# Patient Record
Sex: FEMALE | Race: WHITE
[De-identification: names, ages, dates, MRNs, and addresses within clinical notes are randomized per-mention and may not be internally consistent; named-entity substitution may affect disease eponyms.]

---

## 2017-03-06 ENCOUNTER — HOSPITAL ENCOUNTER (EMERGENCY)
Dept: HOSPITAL 25 - ED | Age: 27
Discharge: HOME | End: 2017-03-06
Payer: COMMERCIAL

## 2017-03-06 VITALS — SYSTOLIC BLOOD PRESSURE: 113 MMHG | DIASTOLIC BLOOD PRESSURE: 79 MMHG

## 2017-03-06 DIAGNOSIS — K52.9: Primary | ICD-10-CM

## 2017-03-06 DIAGNOSIS — F17.210: ICD-10-CM

## 2017-03-06 LAB
ALBUMIN SERPL BCG-MCNC: 3.8 G/DL (ref 3.2–5.2)
ALP SERPL-CCNC: 44 U/L (ref 34–104)
ALT SERPL W P-5'-P-CCNC: 9 U/L (ref 7–52)
AMYLASE SERPL-CCNC: 54 U/L (ref 29–103)
ANION GAP SERPL CALC-SCNC: 4 MMOL/L (ref 2–11)
AST SERPL-CCNC: 12 U/L (ref 13–39)
BUN SERPL-MCNC: 11 MG/DL (ref 6–24)
BUN/CREAT SERPL: 14.9 (ref 8–20)
CALCIUM SERPL-MCNC: 8.5 MG/DL (ref 8.6–10.3)
CHLORIDE SERPL-SCNC: 105 MMOL/L (ref 101–111)
GLOBULIN SER CALC-MCNC: 2.7 G/DL (ref 2–4)
GLUCOSE SERPL-MCNC: 90 MG/DL (ref 70–100)
HCO3 SERPL-SCNC: 26 MMOL/L (ref 22–32)
HCT VFR BLD AUTO: 36 % (ref 35–47)
HGB BLD-MCNC: 11.5 G/DL (ref 12–16)
LIPASE SERPL-CCNC: 10 U/L (ref 11–82)
MCH RBC QN AUTO: 27 PG (ref 27–31)
MCHC RBC AUTO-ENTMCNC: 32 G/DL (ref 31–36)
MCV RBC AUTO: 85 FL (ref 80–97)
POTASSIUM SERPL-SCNC: 4.2 MMOL/L (ref 3.5–5)
PROT SERPL-MCNC: 6.5 G/DL (ref 6.4–8.9)
RBC # BLD AUTO: 4.21 10^6/UL (ref 4–5.4)
SODIUM SERPL-SCNC: 135 MMOL/L (ref 133–145)
WBC # BLD AUTO: 14.8 10^3/UL (ref 3.5–10.8)

## 2017-03-06 PROCEDURE — 99283 EMERGENCY DEPT VISIT LOW MDM: CPT

## 2017-03-06 PROCEDURE — 74177 CT ABD & PELVIS W/CONTRAST: CPT

## 2017-03-06 PROCEDURE — 85025 COMPLETE CBC W/AUTO DIFF WBC: CPT

## 2017-03-06 PROCEDURE — 96374 THER/PROPH/DIAG INJ IV PUSH: CPT

## 2017-03-06 PROCEDURE — 82150 ASSAY OF AMYLASE: CPT

## 2017-03-06 PROCEDURE — 96375 TX/PRO/DX INJ NEW DRUG ADDON: CPT

## 2017-03-06 PROCEDURE — 96376 TX/PRO/DX INJ SAME DRUG ADON: CPT

## 2017-03-06 PROCEDURE — 83690 ASSAY OF LIPASE: CPT

## 2017-03-06 PROCEDURE — 36415 COLL VENOUS BLD VENIPUNCTURE: CPT

## 2017-03-06 PROCEDURE — 96360 HYDRATION IV INFUSION INIT: CPT

## 2017-03-06 PROCEDURE — 80053 COMPREHEN METABOLIC PANEL: CPT

## 2017-03-06 PROCEDURE — 83605 ASSAY OF LACTIC ACID: CPT

## 2017-03-06 PROCEDURE — 86140 C-REACTIVE PROTEIN: CPT

## 2017-03-06 NOTE — ED
Abdominal Pain/Female





- HPI Summary


HPI Summary: 


Patient presents with diffuse abdominal pain that began last night without 

known cause. The pain is a dull constant with episode of intense pain that 

subside spontaneously. Her pain is made worse by deep breaths and movement, and 

made better when her knees are tucked up. She has nausea without vomiting and 

has had diarrhea vs. loose stool for the past 2 weeks. She denies recent illness

, fever, chills, prior abdominal surgeries or food exposure. Her periods are 

regular and she has a monogamous partner. No vaginal discharge.





- History of Current Complaint


Chief Complaint: EDAbdPain


Stated Complaint: ABD PAIN


Time Seen by Provider: 03/06/17 08:58


Hx Obtained From: Patient


Hx Last Menstrual Period: May 20, 2016


Pregnant?: No


Onset/Duration: Gradual Onset


Timing: Constant


Severity Initially: Moderate


Severity Currently: Severe


Pain Intensity: 8


Location: Diffuse


Radiates: No


Character: Sharp, Colicy


Aggravating Factor(s): Movement, Deep Breaths


Alleviating Factor(s): Other: - bending knees to chest


Associated Signs and Symptoms: Positive: Nausea, Diarrhea


Allergies/Adverse Reactions: 


 Allergies











Allergy/AdvReac Type Severity Reaction Status Date / Time


 


No Known Allergies Allergy   Verified 12/02/16 20:43














PMH/Surg Hx/FS Hx/Imm Hx


Endocrine/Hematology History: Reports: Hx Thyroid Disease - resolved


   Denies: Hx Diabetes


Cardiovascular History: 


   Denies: Hx Hypertension


Respiratory History: Reports: Hx Asthma


   Denies: Hx Chronic Obstructive Pulmonary Disease (COPD)


GI History: 


   Denies: Hx Ulcer





- Surgical History


Surgery Procedure, Year, and Place: C section 2012


Infectious Disease History: No


Infectious Disease History: 


   Denies: Hx Hepatitis, Hx Human Immunodeficiency Virus (HIV), History Other 

Infectious Disease, Traveled Outside the [place] in Last 30 Days





- Family History


Known Family History: Positive: None, Cardiac Disease - quadruple bypass 

surgery - father, Diabetes, Other - thyroid disease - mother


   Negative: Blood Disorder - pt denies bleeding disorders in family





- Social History


Occupation: Employed Full-time


Lives: With Family


Alcohol Use: Occasionally


Hx Substance Use: No


Substance Use Type: Reports: None


Hx Tobacco Use: Yes


Smoking Status (MU): Light Every Day Tobacco Smoker


Amount Used/How Often: 6-7 cigs


Have You Smoked in the Last Year: Yes


Cessation Counseling: Patient Advised to Stop





Review of Systems


Negative: Fever


Negative: Chest Pain


Negative: Shortness Of Breath


Positive: Abdominal Pain, Diarrhea, Nausea.  Negative: Vomiting


Negative: Headache


Psychological: Normal


All Other Systems Reviewed And Are Negative: Yes





Physical Exam


Triage Information Reviewed: Yes


Vital Signs On Initial Exam: 


 Initial Vitals











Temp Pulse Resp BP Pulse Ox


 


 98.2 F   110   16   105/64   97 


 


 03/06/17 08:51  03/06/17 08:51  03/06/17 08:51  03/06/17 08:51  03/06/17 08:51











Vital Signs Reviewed: Yes


Appearance: Positive: Well-Appearing, Well-Nourished, Pain Distress


Skin: Positive: Warm, Skin Color Reflects Adequate Perfusion, Dry


Head/Face: Positive: Normal Head/Face Inspection


Eyes: Positive: EOMI, JERILYN, Conjunctiva Clear


ENT: Positive: Hearing grossly normal


Neck: Positive: Supple, Nontender, No Lymphadenopathy


Respiratory/Lung Sounds: Positive: Clear to Auscultation, Breath Sounds Present


Cardiovascular: Positive: Tachycardia


Abdomen Description: Positive: Soft, Guarding.  Negative: Nontender - diffuse 

tenderness to light palpation, CVA Tenderness (R), CVA Tenderness (L)


Bowel Sounds: Positive: Present


Musculoskeletal: Positive: Strength/ROM Intact.  Negative: Edema Left, Edema 

Right


Neurological: Positive: Sensory/Motor Intact, Alert, Oriented to Person Place, 

Time, NV Bundle Intact Distally


Psychiatric: Positive: Affect/Mood Appropriate


AVPU Assessment: Alert





- Gordy Coma Scale


Coma Scale Total: 15





Diagnostics





- Vital Signs


 Vital Signs











  Temp Pulse Resp BP Pulse Ox


 


 03/06/17 09:39    20  


 


 03/06/17 09:18  99.9 F    


 


 03/06/17 09:07   100    95


 


 03/06/17 09:05     119/68 


 


 03/06/17 08:51  98.2 F  110  16  105/64  97














- Laboratory


Lab Results: 


 Lab Results











  03/06/17 Range/Units





  09:25 


 


WBC  14.8 H  (3.5-10.8)  10^3/ul


 


RBC  4.21  (4.0-5.4)  10^6/ul


 


Hgb  11.5 L  (12.0-16.0)  g/dl


 


Hct  36  (35-47)  %


 


MCV  85  (80-97)  fL


 


MCH  27  (27-31)  pg


 


MCHC  32  (31-36)  g/dl


 


RDW  15  (10.5-15)  %


 


Plt Count  221  (150-450)  10^3/ul


 


MPV  8  (7.4-10.4)  um3


 


Neut % (Auto)  86.7 H  (38-83)  %


 


Lymph % (Auto)  4.9 L  (25-47)  %


 


Mono % (Auto)  6.6  (1-9)  %


 


Eos % (Auto)  1.4  (0-6)  %


 


Baso % (Auto)  0.4  (0-2)  %


 


Absolute Neuts (auto)  12.9 H  (1.5-7.7)  10^3/ul


 


Absolute Lymphs (auto)  0.7 L  (1.0-4.8)  10^3/ul


 


Absolute Monos (auto)  1.0 H  (0-0.8)  10^3/ul


 


Absolute Eos (auto)  0.2  (0-0.6)  10^3/ul


 


Absolute Basos (auto)  0.1  (0-0.2)  10^3/ul


 


Absolute Nucleated RBC  0.01  10^3/ul


 


Nucleated RBC %  0.1  











Result Diagrams: 


 03/06/17 09:25





 03/06/17 09:25


Lab Statement: Any lab studies that have been ordered have been reviewed, and 

results considered in the medical decision making process.





- CT


  ** No standard instances


CT Interpretation: Positive (See Comments)


CT Interpretation Completed By: Radiologist - colitis





Re-Evaluation





- Re-Evaluation


  ** First Eval


Re-Evaluation Time: 10:00


Change: Improved


Comment: pain decreased with medication





Abdominal Pain Fem Course/Dx





- Diagnoses


Differential Diagnosis: Positive: Appendicitis, Bowel Obstruction, Constipation

, Diverticulitis, Ovarian Cyst, Urinary Tract Infection


Provider Diagnoses: 


 Colitis








- Provider Notifications


Discussed Care Of Patient With: Dr. Onofre, ED attending.





Discharge





- Discharge Plan


Condition: Stable


Disposition: HOME


Prescriptions: 


Ciprofloxacin TAB* [Cipro Tab*] 500 mg PO BID #13 tab


Ibuprofen TAB* [Motrin TAB* 600 MG] 600 mg PO Q6H PRN #60 tab


 PRN Reason: Pain


Metronidazole [Flagyl 500 MG TAB] 500 mg PO TID #20 tab


Ondansetron ODT TAB* [Zofran Odt TAB*] 4 mg PO Q6H PRN #20 tab.odt


 PRN Reason: Nausea


traMADol TAB* [Ultram*] 50 mg PO Q6HR PRN #32 tab MDD 8


 PRN Reason: Pain


Patient Education Materials:  Colitis (ED)


Forms:  *Work Release


Referrals: 


Abdon Ty MD [Primary Care Provider] - 


Additional Instructions: 


Please take the medication provided until it is completely gone. Use ibuprofen 

600 mg every 6 hours with meals to reduce pain and swelling. Add Tramadol for 

uncontrolled pain. Follow-up with your primary care provider in 3-4 days for 

evaluation. Drink extra fluids and use the anti-nausea medication as needed. 

Return to the emergency department if symptoms worsen.

## 2017-12-02 ENCOUNTER — HOSPITAL ENCOUNTER (EMERGENCY)
Dept: HOSPITAL 25 - UCEAST | Age: 27
Discharge: HOME | End: 2017-12-02
Payer: COMMERCIAL

## 2017-12-02 VITALS — SYSTOLIC BLOOD PRESSURE: 127 MMHG | DIASTOLIC BLOOD PRESSURE: 59 MMHG

## 2017-12-02 DIAGNOSIS — L02.01: Primary | ICD-10-CM

## 2017-12-02 DIAGNOSIS — F31.9: ICD-10-CM

## 2017-12-02 DIAGNOSIS — B95.62: ICD-10-CM

## 2017-12-02 DIAGNOSIS — F17.210: ICD-10-CM

## 2017-12-02 PROCEDURE — 99212 OFFICE O/P EST SF 10 MIN: CPT

## 2017-12-02 PROCEDURE — 87205 SMEAR GRAM STAIN: CPT

## 2017-12-02 PROCEDURE — G0463 HOSPITAL OUTPT CLINIC VISIT: HCPCS

## 2017-12-02 PROCEDURE — 87186 SC STD MICRODIL/AGAR DIL: CPT

## 2017-12-02 PROCEDURE — 87070 CULTURE OTHR SPECIMN AEROBIC: CPT

## 2017-12-02 PROCEDURE — 87077 CULTURE AEROBIC IDENTIFY: CPT

## 2017-12-02 NOTE — UC
Skin Complaint HPI





- HPI Summary


HPI Summary: 


4 DAY H/O TENDER BUMP ON FOREHEAD. GETTING BIGGER. TRIED TO SQUEEZE IT BUT 

NOTHING CAME OUT. NO FEVER. HAS A H/O RECURRENT AXILLARY ABSCESSES BUT NONE IN 

SOME YEARS. DOES NOT KNOW IF SHE EVER HAD MRSA.





- History of Current Complaint


Chief Complaint: UCSkin


Time Seen by Provider: 12/02/17 18:25


Stated Complaint: RASH


Hx Obtained From: Patient


Hx Last Menstrual Period: 12/2/17


Onset/Duration: Gradual Onset, Lasting Days, Still Present


Timing: Constant


Onset Severity: Moderate


Current Severity: Moderate


Pain Intensity: 6


Pain Scale Used: 0-10 Numeric


Location: Discrete - RIGHT SIDE OF FOREHEAD


Character: Pain, Redness, Raised


Aggravating Factor(s): Touch


Alleviating Factor(s): Nothing


Associated Signs & Symptoms: Positive: Tenderness





- Allergy/Home Medications


Allergies/Adverse Reactions: 


 Allergies











Allergy/AdvReac Type Severity Reaction Status Date / Time


 


No Known Allergies Allergy   Verified 12/02/17 18:13











Home Medications: 


 Home Medications





Fluoxetine HCl [Prozac] 1 tab PO DAILY 12/02/17 [History Confirmed 12/02/17]


Oxcarbazepine [Trileptal 150 mg] 1 tab PO DAILY 12/02/17 [History Confirmed 12/ 02/17]











Review of Systems


Constitutional: Negative


Skin: Other - TENDER LESION ON FOREHEAD


Respiratory: Negative


Cardiovascular: Negative


Gastrointestinal: Negative


All Other Systems Reviewed And Are Negative: Yes





PMH/Surg Hx/FS Hx/Imm Hx


Psychological History: Depression, Bipolar Disorder





- Surgical History


Surgical History: Yes


Surgery Procedure, Year, and Place: C section 2012





- Family History


Known Family History: Positive: Cardiac Disease - quadruple bypass surgery - 

father, Hypertension, Diabetes, Other - thyroid disease - mother


   Negative: Blood Disorder - pt denies bleeding disorders in family





- Social History


Alcohol Use: Occasionally


Substance Use Type: None


Smoking Status (MU): Light Every Day Tobacco Smoker


Type: Cigarettes


Amount Used/How Often: 1/2PPD


Have You Smoked in the Last Year: Yes


Household Exposure Type: Cigarettes





- Immunization History


Most Recent Influenza Vaccination: unknown





Physical Exam


Triage Information Reviewed: Yes


Appearance: Well-Appearing, No Pain Distress, Well-Nourished


Vital Signs: 


 Initial Vital Signs











Temp  99.0 F   12/02/17 18:08


 


Pulse  72   12/02/17 18:08


 


Resp  16   12/02/17 18:08


 


BP  127/59   12/02/17 18:08


 


Pulse Ox  100   12/02/17 18:08











Vital Signs Reviewed: Yes


Eyes: Positive: Conjunctiva Clear


ENT: Positive: Hearing grossly normal


Neck: Positive: Supple


Respiratory: Positive: No respiratory distress, No accessory muscle use


Cardiovascular: Positive: Pulses Normal


Abdomen Description: Positive: Soft


Musculoskeletal: Positive: No Edema


Neurological: Positive: Alert


Psychological: Positive: Age Appropriate Behavior


Skin: Positive: Other - 1CM TENDER, RAISED, ERYTHEMATOUS LESION RIGHT SIDE OF 

FOREHEAD WITH CENTRAL SCAB.





Course/Dx





- Diagnoses


Provider Diagnoses: ABSCESS FOREHEAD





Discharge





- Discharge Plan


Condition: Stable


Disposition: HOME


Prescriptions: 


Sulfamethox/Trimethoprim DS* [Bactrim /160 TAB*] 1 tab PO BID #13 tab


Patient Education Materials:  Abscess (ED)


Referrals: 


Abdon Ty MD [Medical Doctor] - If Needed


Additional Instructions: 


WARM/HOT COMPRESSES AT LEAST 4 TIMES DAILY





SPECIMEN SENT FOR CULTURE





TAKE FULL COURSE OF ANTIBIOTICS. CALL IN 3-4 DAYS FOR RESULTS.





SEEK FOLLOW-UP IF SYMPTOMS NOT IMPROVING AS EXPECTED WITH TREATMENT.

## 2017-12-05 NOTE — UC
Progress





- Progress Note


Progress Note: 





+ MRSA 


Pt on Bactrim


Pt with + sensitivity 


no change


Josie 12/5/2017

## 2019-07-28 ENCOUNTER — HOSPITAL ENCOUNTER (EMERGENCY)
Dept: HOSPITAL 25 - ED | Age: 29
Discharge: HOME | End: 2019-07-28
Payer: COMMERCIAL

## 2019-07-28 VITALS — DIASTOLIC BLOOD PRESSURE: 79 MMHG | SYSTOLIC BLOOD PRESSURE: 134 MMHG
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
## 2019-07-28 DIAGNOSIS — S91.311A: Primary | ICD-10-CM
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
## 2019-07-28 DIAGNOSIS — F17.210: ICD-10-CM

## 2019-07-28 DIAGNOSIS — Z23: ICD-10-CM

## 2019-07-28 PROCEDURE — 90715 TDAP VACCINE 7 YRS/> IM: CPT

## 2019-07-28 PROCEDURE — 99282 EMERGENCY DEPT VISIT SF MDM: CPT

## 2019-07-28 PROCEDURE — 90471 IMMUNIZATION ADMIN: CPT

## 2019-07-28 NOTE — ED
Skin Complaint





- HPI Summary


HPI Summary: 





This patient is a 28 year old F presenting to [facility] accompanied by her  

with a chief complaint of A cut on [place] 

happened at 1700 7/27/19. She states that the pain is rated a 4/10 in severity. 

She denies any swelling. She stated that she cant walk on her R foot due to 

the pain. She states no alleviating factors. Pt denies any fever, chills, 

erythema of eyes, sore throat, CP, SOB, cough, abdominal pain, N/V, dysuria, 

hematuria, myalgia, edema, rash, or dizziness. She stated that she was unsure 

of when her last tetanus shot was. 





- History of Current Complaint


Chief Complaint: EDExtremityLower


Time Seen by Provider: 07/28/19 20:51


Stated Complaint: RIGHT FOOT LACERATION/INFECTION PER PT


Hx Obtained From: Patient


Onset/Duration: Started Days Ago - 1, Traumatic, Still Present


Skin Exposure Onset/Duration: Days Ago - 1


Timing: Constant


Onset Severity: Moderate


Current Severity: Moderate


Pain Intensity: 4


Pain Scale Used: 0-10 Numeric


Skin Location: Foot - 4


Character: Pain


Aggravating Symptom(s): Touch, Other: - standing


Alleviating Symptom(s): Nothing


Associated Signs & Symptoms: Negative - fever, chills, erythema of eyes, sore 

throat, CP, SOB, cough, abdominal pain, N/V, dysuria, hematuria, myalgia, edema

, rash, or dizziness.


Related History: Trauma - stepped on a rock while swimming [place] a lake





- Allergy/Home Medications


Allergies/Adverse Reactions: 


 Allergies











Allergy/AdvReac Type Severity Reaction Status Date / Time


 


No Known Allergies Allergy   Verified 07/28/19 19:42














PMH/Surg Hx/FS Hx/Imm Hx


Previously Healthy: Yes


Endocrine/Hematology History: Reports: Hx Thyroid Disease - resolved


   Denies: Hx Diabetes


Cardiovascular History: 


   Denies: Hx Hypertension


Respiratory History: Reports: Hx Asthma


   Denies: Hx Chronic Obstructive Pulmonary Disease (COPD)


GI History: 


   Denies: Hx Ulcer


 History: 


   Denies: Hx Dialysis, Hx Renal Disease





- Surgical History


Surgery Procedure, Year, and Place: C section 2012


Infectious Disease History: No


Infectious Disease History: 


   Denies: Hx Hepatitis, Hx Human Immunodeficiency Virus (HIV), History Other 

Infectious Disease, Traveled Outside the [place] in Last 30 Days





- Family History


Known Family History: Positive: Cardiac Disease - quadruple bypass surgery - 

father, Hypertension, Diabetes, Other - thyroid disease - mother


   Negative: Blood Disorder - pt denies bleeding disorders in family





- Social History


Occupation: Employed Full-time


Lives: With Family


Alcohol Use: Occasionally


Hx Substance Use: No


Substance Use Type: Reports: None


Hx Tobacco Use: Yes


Smoking Status (MU): Light Every Day Tobacco Smoker


Type: Cigarettes


Amount Used/How Often: 1/2PPD


Have You Smoked in the Last Year: Yes





Review of Systems


Negative: Fever, Skin Diaphoresis


Negative: Erythema


Negative: Sore Throat


Negative: Chest Pain


Negative: Shortness Of Breath


Negative: Abdominal Pain, Vomiting, Nausea


Negative: dysuria, hematuria


Positive: Myalgia - R foot pain.  Negative: Edema


Positive: Other - Small laceration to her R foot.  Negative: Rash


Neurological: Negative - dizziness


Negative: Headache


All Other Systems Reviewed And Are Negative: Yes





Physical Exam





- Summary


Physical Exam Summary: 





VITAL SIGNS: Reviewed.


GENERAL:  Patient is a well-developed and nourished female who is lying 

comfortable in the stretcher. Patient is not in any acute respiratory distress.


HEAD AND FACE: No signs of trauma. No ecchymosis, hematomas or skull 

depressions. No sinus tenderness.


EYES: PERRLA, EOMI x 2, No injected conjunctiva, no nystagmus.


EARS: Hearing grossly intact. Ear canals and tympanic membranes are within 

normal limits.


MOUTH: Oropharynx within normal limits.


NECK: Supple, trachea is midline, no adenopathy, no JVD, no carotid bruit, no c-

spine tenderness, neck with full ROM


CHEST: Symmetric, no tenderness at palpation


LUNGS: Clear to auscultation bilaterally. No wheezing or crackles.


CVS: Regular rate and rhythm, S1 and S2 present, no murmurs or gallops 

appreciated.


ABDOMEN: Soft, non-tender. No signs of distention. No rebound no guarding, and 

no masses palpated. Bowel sounds are normal.


EXTREMITIES: FROM in all major joints, no edema, no cyanosis or clubbing.


NEURO: Alert and oriented x 3. No acute neurological deficits. Speech is normal 

and follows commands.


SKIN: Dry and warm. 1.5 cm superficial laceration with surrounding tenderness 

without erythema to the Sole of the R foot just proximal to the 4th web space





Triage Information Reviewed: Yes


Vital Signs On Initial Exam: 


 Initial Vitals











Temp Pulse Resp BP Pulse Ox


 


 97.4 F   91   15   132/88   97 


 


 07/28/19 19:41  07/28/19 19:41  07/28/19 19:41  07/28/19 19:41  07/28/19 19:41











Vital Signs Reviewed: Yes





Diagnostics





- Vital Signs


 Vital Signs











  Temp Pulse Resp BP Pulse Ox


 


 07/28/19 19:41  97.4 F  91  15  132/88  97














- Laboratory


Lab Statement: Any lab studies that have been ordered have been reviewed, and 

results considered in the medical decision making process.





Course/Dx





- Course


Course Of Treatment: This pt is a 27 y/o F presenting to [facility] with a CC of a R 

foot laceraion she [place] 1500 7[place]27/19.  Upon 

her PE she is found to have a 1.5 cm superficial laceration with surrounding 

tenderness without erythema to the Sole of the R foot just proximal to the 4th 

web space.  The laceration will not be sutured due to it being older than 24 

hours to avoid the risk of increasing a possible infection. She will be 

discharged home with a Dx of a R foot laceration. She will be prescribed ABX 

and given a work note excusing her from work tomorrow 7/29/19 and permitting 

her to wear open toed shoes for the next 10 days.





- Diagnoses


Provider Diagnoses: 


 Laceration of right foot





Is Visit Pregnancy Related: No





Discharge





- Sign-Out/Discharge


Documenting (check all that apply): Patient Departure - discharge


Patient Received Moderate/Deep Sedation with Procedure: No





- Discharge Plan


Condition: Stable


Disposition: HOME


Patient Education Materials:  Laceration (ED)


Forms:  *Work Release


Referrals: 


[facility] [Outside] - 2 Days


Additional Instructions: 


Please take the prescribed medications as directed and return to the emergency 

department for any new or worsening symptoms. Follow up with [facility] in 2-3 days. Wear opened toed shoes for the next 10 days 

including while at work. 





- Attestation Statements


Document Initiated by Scribe: Yes


Documenting Scribe: Douglas Jean


Provider For Whom Scribe is Documenting (Include Credential): Harriet Antony MD


Scribe Attestation: 


Douglas COPPOLA, scribed for Harriet Antony MD on 07/28/19 at 2117. 


Status of Scribe Document: Ready

## 2022-09-15 NOTE — RAD
INDICATION:  Diffuse abdominal pain and diarrhea.



COMPARISON:  There are no prior studies available for comparison.



TECHNIQUE: A CT scan of the abdomen and pelvis was performed with intravenous and oral

contrast following intravenous injection of 93 ml of  Omnipaque 300 nonionic contrast.

Contiguous axial sections were obtained from the lung bases through the symphysis pubis.

Images were reconstructed in the coronal and sagittal planes.



FINDINGS:  There is mild dependent bilateral lower lobe subsegmental atelectasis.  No

pleural effusion is present.



The liver and spleen are within normal limits in size without significant focal

abnormality. No calcified gallstones are seen. The pancreas appears to be within normal

limits in size.



The kidneys and adrenal glands are normal in size. No hydronephrosis is seen.  No

significant focal renal abnormality is seen.



The aorta is normal in caliber and demonstrates homogeneous contrast opacification.



No significant enlarged retroperitoneal lymph nodes are seen.



The stomach, small and large bowel appear nondistended.  The appendix is within normal

limits.  There is mild thickening of the wall of the descending and sigmoid colon

suggestive of mild colitis.



The uterus is anteverted and mildly prominent measuring 10.6 x 6.0 x 4.7 cm in size. There

is prominence of the endometrial stripe measuring 1.4 cm in thickness. There is a 2.3 x

1.8 cm right ovarian cyst. There is a small amount of free intraperitoneal fluid in the

cul-de-sac. No free intraperitoneal air is seen.



No significant focal osseous abnormality is seen.



IMPRESSION:  

1. MILD THICKENING OF THE WALL OF THE DESCENDING AND SIGMOID COLON SUGGESTIVE OF COLITIS.

2. MILD ENLARGEMENT OF THE UTERUS. 2.3 CM RIGHT OVARIAN CYST AND SMALL AMOUNT OF FREE

INTRAPERITONEAL FLUID IN THE CUL-DE-SAC.
operating room